# Patient Record
Sex: MALE | Race: WHITE
[De-identification: names, ages, dates, MRNs, and addresses within clinical notes are randomized per-mention and may not be internally consistent; named-entity substitution may affect disease eponyms.]

---

## 2018-03-22 NOTE — RADIOLOGY REPORT (SQ)
EXAM DESCRIPTION:  KNEE LEFT 4 VIEW



COMPLETED DATE/TIME:  3/22/2018 3:37 pm



REASON FOR STUDY:  left knee pain



COMPARISON:  None.



NUMBER OF VIEWS:  Four views.



TECHNIQUE:  AP, lateral, and both oblique radiographic images acquired of the left knee.



LIMITATIONS:  None.



FINDINGS:  MINERALIZATION: Normal.

BONES: There is a total knee arthroplasty in good position.

JOINT: No effusion.

SOFT TISSUES: No soft tissue swelling.  No radio-opaque foreign body.

OTHER: No other significant finding.



IMPRESSION:  NEGATIVE STUDY OF THE LEFT KNEE. NO RADIOGRAPHIC EVIDENCE OF ACUTE INJURY.



TECHNICAL DOCUMENTATION:  JOB ID:  [id number]

 2011 [employer]- All Rights Reserved



Reading location - IP/workstation name: [email or web address]

## 2018-03-22 NOTE — ER DOCUMENT REPORT
ED Extremity Problem, Lower





- General


Chief Complaint: Knee Pain


Stated Complaint: SWOLLEN KNEE


Time Seen by Provider: 03/22/18 15:31


Mode of Arrival: Wheelchair


Information source: Patient


Notes: 





57-year-old male presents to ED for complaint of left knee pain.  He states 

that the left knee has been swollen for about a week with pain.  He states he 

might have slept on it wrong.  States she has been taken Tylenol at home for 

the pain.  States he had a knee replacement in 2003.  He states normally he can 

put weight on it but he has not been able to put weight on it for the last 3 

days.


TRAVEL OUTSIDE OF THE [place] IN LAST 30 DAYS: No





- HPI


Patient complains to provider of: Pain, Swelling.  No: Injury


Location: Knee


Occurred: Other - 3 days


Onset/Duration: Gradual, Persistent


Quality of pain: Sharp, Throbbing


Severity: Severe


Pain Level: 5


Recent injury: No


Associated symptoms: Painful ambulation


Exacerbated by: Hanging down, Movement


Relieved by: Nothing





- Related Data


Allergies/Adverse Reactions: 


 





No Known Allergies Allergy (Verified 12/30/15 16:48)


 











Past Medical History





- General


Information source: Patient





- Social History


Smoking Status: Former Smoker


Cigarette use (# per day): No


Chew tobacco use (# tins/day): No


Smoking Education Provided: No


Frequency of alcohol use: Heavy


Drug Abuse: None


Occupation: disable


Lives with: Spouse/Significant other


Family History: Arthritis, CVA, Hypertension


Patient has suicidal ideation: No


Patient has homicidal ideation: No





- Past Medical History


Cardiac Medical History: Reports: Hx Atrial Fibrillation, Hx Hypertension


Pulmonary Medical History: Reports: None


EENT Medical History: Reports: None


Neurological Medical History: Reports: None


Endocrine Medical History: Reports: None


Renal/ Medical History: Reports: None


Malignancy Medical History: Reports None


GI Medical History: Reports: None


Musculoskeltal Medical History: Reports Hx Arthritis - gout, Reports Hx Gout, 

Reports Hx Musculoskeletal Deformity, Reports Hx Musculoskeletal Trauma


Skin Medical History: Reports None


Psychiatric Medical History: Reports: Hx Anxiety, Hx Bipolar Disorder, Other - 

Insomnia


Traumatic Medical History: Reports: None


Infectious Medical History: Reports: None


Past Surgical History: Reports: Hx Orthopedic Surgery - left knee replacement 

right rotator cuff, Other - Skin cancer removed, lymph node removed





- Immunizations


Hx Diphtheria, Pertussis, Tetanus Vaccination: Yes





Review of Systems





- Review of Systems


Constitutional: No symptoms reported


EENT: No symptoms reported


Cardiovascular: No symptoms reported


Respiratory: No symptoms reported


Gastrointestinal: No symptoms reported


Genitourinary: No symptoms reported


Male Genitourinary: No symptoms reported


Musculoskeletal: Other - Pain to the back of the knee.  He states he might have 

slept on it wrong.  He does have a knee replacement on this knee.  He states 

that his knee is swollen but actually the other knee is larger than this knee.  

He states she has been taken Tylenol at home.


Skin: No symptoms reported


Hematologic/Lymphatic: No symptoms reported


Neurological/Psychological: No symptoms reported


-: Yes All other systems reviewed and negative





Physical Exam





- Vital signs


Vitals: 


 











Temp Pulse BP Pulse Ox


 


 97.7 F   77   119/91 H  93 


 


 03/22/18 15:12  03/22/18 15:12  03/22/18 15:12  03/22/18 15:12











Interpretation: Normal





- General


General appearance: Appears well, Alert





- HEENT


Head: Normocephalic, Atraumatic


Eyes: Normal


Pupils: PERRL





- Respiratory


Respiratory status: No respiratory distress


Chest status: Nontender


Breath sounds: Normal


Chest palpation: Normal





- Cardiovascular


Rhythm: Regular


Heart sounds: Normal auscultation


Murmur: No





- Abdominal


Inspection: Normal


Distension: No distension


Bowel sounds: Normal


Tenderness: Nontender


Organomegaly: No organomegaly





- Back


Back: Normal, Nontender





- Extremities


General upper extremity: Normal inspection, Nontender, Normal color, Normal ROM

, Normal temperature


General lower extremity: Normal temperature.  No: All's sign


Knee: Tender, Pain with ROM, Patellar tendon intact, Popliteal fossa tender, 

Tender joint line, Other - Lower extremity discoloration chronic.  No: Abrasion

, Deformity, Dislocation, Drawer's test instability, Ecchymosis, Instability, 

Joint effusion, Laceration, Laxity with valgus stress, Laxity with varus stress





- Neurological


Neuro grossly intact: Yes


Cognition: Normal


Orientation: AAOx4


[place] Coma Scale Eye Opening: Spontaneous


[place] Coma Scale Verbal: Oriented


[place] Coma Scale Motor: Obeys Commands


[place] Coma Scale Total: 15


Speech: Normal


Motor strength normal: LUE, RUE, LLE, RLE


Sensory: Normal





- Psychological


Associated symptoms: Normal affect, Normal mood





- Skin


Skin Temperature: Warm


Skin Moisture: Dry


Skin Color: Normal





Course





- Re-evaluation


Re-evalutation: 





03/22/18 21:26


Left knee x-ray and venous Doppler were both negative.  Patient stated he could 

not get up out of the chair to get onto the bed when first examined.  But after 

he was told that his x-rays and ultrasound were negative he was able to get 

himself up and into the chair.  Patient was instructed to follow-up with the 

surgeon who did his knee replacement and [facility] for his continued pain.  





- Vital Signs


Vital signs: 


 











Temp Pulse Resp BP Pulse Ox


 


 97.6 F   75   16   118/75   98 


 


 03/22/18 17:00  03/22/18 17:00  03/22/18 17:00  03/22/18 17:00  03/22/18 17:00














- Diagnostic Test


Radiology reviewed: Image reviewed, Reports reviewed





Discharge





- Discharge


Clinical Impression: 


Left knee pain


Qualifiers:


 Chronicity: unspecified Qualified Code(s): M25.562 - Pain in left knee





Condition: Stable


Disposition: HOME, SELF-CARE


Instructions:  Knee Exercise Program (OM)


Additional Instructions: 


He was seen today for pain to the left knee for the last several days.  There 

is no acute changes to your x-ray Doppler is negative for any blood clots or 

Baker's cyst.  There may be a small effusion according to the Doppler on the 

lateral aspect of the knee.





ICE & ELEVATION:


     Apply ice packs frequently against the painful area.  Many different 

schedules are recommended, such as "20 minutes on, 20 minutes off" or "one hour 

ice, two hours rest."  If you need to work, you may need to go longer between 

ice treatments.  You should plan to have the area ice packed AT LEAST one-

fourth of the time.


     The ice should be applied over the wrap, tape, or splint, or over a layer 

of cloth -- not directly against the skin.  Some ice bags have a built-in cloth 

and can be put directly on the skin.


     Your injured part should be elevated as much as possible over the next 48 

hours.  Try to keep the injury above the level of the heart. Avoid use of the 

injured area.  Elevation and rest will decrease the swelling.





ORAL NARCOTIC MEDICATION:


     You have been given a prescription for pain control.  This medication is a 

narcotic.  It's best taken with food, as nausea can result if taken on an empty 

stomach.


     Don't operate machinery or drive within six hours of taking this 

medication.  Do not combine this medicine with alcohol, or with any medication 

which can cause sedation (such as cold tablets or sleeping pills) unless you 

get permission from the physician.


     Narcotics tend to cause constipation.  If possible, drink plenty of fluids 

and eat a diet high in fiber and fruits.





     Please be aware that prescription narcotics also have the potential for 

abuse.  People become addicted to these medications because of the general 

sense of wellbeing that they induce.  This feeling along with a significant 

reduction in tension, anxiety, and aggression provides a stimulating seductive 

quality to these drugs.  Once your pain is under control, we encourage you to 

discard your unused narcotics.











FOLLOW-UP CARE: Please call your orthopedic specialist today and schedule a 

follow-up appointment for this knee pain.  If you cannot call him today then 

calling tomorrow.


If you have been referred to a physician for follow-up care, call the physician

s office for an appointment as you were instructed or within the next two days.

  If you experience worsening or a significant change in your symptoms, notify 

the physician immediately or return to the Emergency Department at any time for 

re-evaluation.


Prescriptions: 


Hydrocodone/Acetaminophen [Norco 5-325 mg Tablet] 1 tab PO Q6HP PRN #5 tablet


 PRN Reason: 


Forms:  Elevated Blood Pressure

## 2018-03-22 NOTE — RADIOLOGY REPORT (SQ)
EXAM DESCRIPTION:  VENOUS UNILATERAL LOWER



COMPLETED DATE/TIME:  3/22/2018 4:50 pm



REASON FOR STUDY:  left leg pain sedentary



COMPARISON:  Left knee plain films 3/22/2018



TECHNIQUE:  Dynamic and static gray scale and color images acquired of the left leg venous system. Se
lected spectral images acquired with additional compression and augmentation maneuvers. The contralat
eral common femoral vein and saphenofemoral junction were also imaged. Images stored on PACS.



LIMITATIONS:  None.



FINDINGS:  LEFT

COMMON FEMORAL: Normal phasicity, compression and augmentation. No visualized echogenic material on g
ray scale. No defects on color images.

FEMORAL: Normal compression and augmentation. No visualized echogenic material on gray scale. No defe
cts on color images.

POPLITEAL: Normal compression, augmentation. No visualized echogenic material on gray scale. No defec
ts on color images.

CALF VESSELS: Normal compression, augmentation. No visualized echogenic material on gray scale. No de
fects on color images.

GSV and SSV: Normal compression, augmentation. No visualized echogenic material on gray scale. No def
ects on color images.

ANY DEEP VENOUS INSUFFICIENCY: Not evaluated.

ANY EVIDENCE OF POPLITEAL CYST: Baker's cyst versus joint effusion in the popliteal fossa

OTHER: No other significant finding.

RIGHT COMMON FEMORAL VEIN AND SAPHENOFEMORAL JUNCTION:

Normal phasicity, compression and augmentation. No visualized echogenic material on gray scale. No de
fects on color images.



IMPRESSION:  NO EVIDENCE OF DVT OR SVT IN THE LEFT LEG.



TECHNICAL DOCUMENTATION:  JOB ID:  [id number]

 2011 [employer]- All Rights Reserved



Reading location - IP/workstation name: [email or web address]

## 2020-05-03 ENCOUNTER — HOSPITAL ENCOUNTER (INPATIENT)
Dept: HOSPITAL 62 - ER | Age: 60
LOS: 2 days | Discharge: HOME | DRG: 440 | End: 2020-05-05
Attending: INTERNAL MEDICINE | Admitting: INTERNAL MEDICINE
Payer: OTHER GOVERNMENT

## 2020-05-03 DIAGNOSIS — Z79.890: ICD-10-CM

## 2020-05-03 DIAGNOSIS — Z79.01: ICD-10-CM

## 2020-05-03 DIAGNOSIS — Z85.828: ICD-10-CM

## 2020-05-03 DIAGNOSIS — F31.9: ICD-10-CM

## 2020-05-03 DIAGNOSIS — M10.9: ICD-10-CM

## 2020-05-03 DIAGNOSIS — E83.42: ICD-10-CM

## 2020-05-03 DIAGNOSIS — Z79.899: ICD-10-CM

## 2020-05-03 DIAGNOSIS — E66.01: ICD-10-CM

## 2020-05-03 DIAGNOSIS — I10: ICD-10-CM

## 2020-05-03 DIAGNOSIS — I48.0: ICD-10-CM

## 2020-05-03 DIAGNOSIS — R11.2: ICD-10-CM

## 2020-05-03 DIAGNOSIS — F10.10: ICD-10-CM

## 2020-05-03 DIAGNOSIS — F41.9: ICD-10-CM

## 2020-05-03 DIAGNOSIS — Z79.84: ICD-10-CM

## 2020-05-03 DIAGNOSIS — K85.20: Primary | ICD-10-CM

## 2020-05-03 DIAGNOSIS — Z96.652: ICD-10-CM

## 2020-05-03 LAB
ADD MANUAL DIFF: NO
ALBUMIN SERPL-MCNC: 3.9 G/DL (ref 3.5–5)
ALP SERPL-CCNC: 214 U/L (ref 38–126)
ANION GAP SERPL CALC-SCNC: 12 MMOL/L (ref 5–19)
APPEARANCE UR: CLEAR
APTT PPP: YELLOW S
AST SERPL-CCNC: 52 U/L (ref 17–59)
BARBITURATES UR QL SCN: NEGATIVE
BASOPHILS # BLD AUTO: 0 10^3/UL (ref 0–0.2)
BASOPHILS NFR BLD AUTO: 0.2 % (ref 0–2)
BILIRUB DIRECT SERPL-MCNC: 0.6 MG/DL (ref 0–0.4)
BILIRUB SERPL-MCNC: 1.5 MG/DL (ref 0.2–1.3)
BILIRUB UR QL STRIP: NEGATIVE
BUN SERPL-MCNC: 13 MG/DL (ref 7–20)
CALCIUM: 9.9 MG/DL (ref 8.4–10.2)
CHLORIDE SERPL-SCNC: 88 MMOL/L (ref 98–107)
CK MB SERPL-MCNC: 2.23 NG/ML (ref ?–4.55)
CK SERPL-CCNC: 37 U/L (ref 55–170)
CO2 SERPL-SCNC: 32 MMOL/L (ref 22–30)
EOSINOPHIL # BLD AUTO: 0.1 10^3/UL (ref 0–0.6)
EOSINOPHIL NFR BLD AUTO: 0.7 % (ref 0–6)
ERYTHROCYTE [DISTWIDTH] IN BLOOD BY AUTOMATED COUNT: 14 % (ref 11.5–14)
GLUCOSE SERPL-MCNC: 150 MG/DL (ref 75–110)
GLUCOSE UR STRIP-MCNC: NEGATIVE MG/DL
HCT VFR BLD CALC: 42.5 % (ref 37.9–51)
HGB BLD-MCNC: 14.9 G/DL (ref 13.5–17)
INR PPP: 1.29
KETONES UR STRIP-MCNC: 20 MG/DL
LYMPHOCYTES # BLD AUTO: 0.5 10^3/UL (ref 0.5–4.7)
LYMPHOCYTES NFR BLD AUTO: 5.3 % (ref 13–45)
MCH RBC QN AUTO: 35.1 PG (ref 27–33.4)
MCHC RBC AUTO-ENTMCNC: 35.1 G/DL (ref 32–36)
MCV RBC AUTO: 100 FL (ref 80–97)
METHADONE UR QL SCN: NEGATIVE
MONOCYTES # BLD AUTO: 0.9 10^3/UL (ref 0.1–1.4)
MONOCYTES NFR BLD AUTO: 9.9 % (ref 3–13)
NEUTROPHILS # BLD AUTO: 7.7 10^3/UL (ref 1.7–8.2)
NEUTS SEG NFR BLD AUTO: 83.9 % (ref 42–78)
NITRITE UR QL STRIP: NEGATIVE
PCP UR QL SCN: NEGATIVE
PH UR STRIP: 6 [PH] (ref 5–9)
PLATELET # BLD: 183 10^3/UL (ref 150–450)
POTASSIUM SERPL-SCNC: 4.3 MMOL/L (ref 3.6–5)
PROT SERPL-MCNC: 7.8 G/DL (ref 6.3–8.2)
PROT UR STRIP-MCNC: 100 MG/DL
PROTHROMBIN TIME: 16.2 SEC (ref 11.4–15.4)
RBC # BLD AUTO: 4.24 10^6/UL (ref 4.35–5.55)
SP GR UR STRIP: 1.02
TOTAL CELLS COUNTED % (AUTO): 100 %
TROPONIN I SERPL-MCNC: 0.05 NG/ML
URINE AMPHETAMINES SCREEN: NEGATIVE
URINE BENZODIAZEPINES SCREEN: NEGATIVE
URINE COCAINE SCREEN: NEGATIVE
URINE MARIJUANA (THC) SCREEN: (no result)
UROBILINOGEN UR-MCNC: 2 MG/DL (ref ?–2)
WBC # BLD AUTO: 9.2 10^3/UL (ref 4–10.5)

## 2020-05-03 PROCEDURE — 85610 PROTHROMBIN TIME: CPT

## 2020-05-03 PROCEDURE — 93010 ELECTROCARDIOGRAM REPORT: CPT

## 2020-05-03 PROCEDURE — 80053 COMPREHEN METABOLIC PANEL: CPT

## 2020-05-03 PROCEDURE — 96365 THER/PROPH/DIAG IV INF INIT: CPT

## 2020-05-03 PROCEDURE — 83690 ASSAY OF LIPASE: CPT

## 2020-05-03 PROCEDURE — 96368 THER/DIAG CONCURRENT INF: CPT

## 2020-05-03 PROCEDURE — 82140 ASSAY OF AMMONIA: CPT

## 2020-05-03 PROCEDURE — 82962 GLUCOSE BLOOD TEST: CPT

## 2020-05-03 PROCEDURE — 36415 COLL VENOUS BLD VENIPUNCTURE: CPT

## 2020-05-03 PROCEDURE — 71045 X-RAY EXAM CHEST 1 VIEW: CPT

## 2020-05-03 PROCEDURE — 80162 ASSAY OF DIGOXIN TOTAL: CPT

## 2020-05-03 PROCEDURE — 96376 TX/PRO/DX INJ SAME DRUG ADON: CPT

## 2020-05-03 PROCEDURE — 84443 ASSAY THYROID STIM HORMONE: CPT

## 2020-05-03 PROCEDURE — 85025 COMPLETE CBC W/AUTO DIFF WBC: CPT

## 2020-05-03 PROCEDURE — 85730 THROMBOPLASTIN TIME PARTIAL: CPT

## 2020-05-03 PROCEDURE — 96361 HYDRATE IV INFUSION ADD-ON: CPT

## 2020-05-03 PROCEDURE — 82553 CREATINE MB FRACTION: CPT

## 2020-05-03 PROCEDURE — 82150 ASSAY OF AMYLASE: CPT

## 2020-05-03 PROCEDURE — 81001 URINALYSIS AUTO W/SCOPE: CPT

## 2020-05-03 PROCEDURE — 76705 ECHO EXAM OF ABDOMEN: CPT

## 2020-05-03 PROCEDURE — C9113 INJ PANTOPRAZOLE SODIUM, VIA: HCPCS

## 2020-05-03 PROCEDURE — 84484 ASSAY OF TROPONIN QUANT: CPT

## 2020-05-03 PROCEDURE — 93005 ELECTROCARDIOGRAM TRACING: CPT

## 2020-05-03 PROCEDURE — 99291 CRITICAL CARE FIRST HOUR: CPT

## 2020-05-03 PROCEDURE — 86140 C-REACTIVE PROTEIN: CPT

## 2020-05-03 PROCEDURE — 80307 DRUG TEST PRSMV CHEM ANLYZR: CPT

## 2020-05-03 PROCEDURE — 83880 ASSAY OF NATRIURETIC PEPTIDE: CPT

## 2020-05-03 PROCEDURE — 96375 TX/PRO/DX INJ NEW DRUG ADDON: CPT

## 2020-05-03 PROCEDURE — 83735 ASSAY OF MAGNESIUM: CPT

## 2020-05-03 PROCEDURE — 82550 ASSAY OF CK (CPK): CPT

## 2020-05-03 RX ADMIN — INSULIN LISPRO SCH: 100 INJECTION, SOLUTION INTRAVENOUS; SUBCUTANEOUS at 18:10

## 2020-05-03 RX ADMIN — MORPHINE SULFATE PRN MG: 10 INJECTION INTRAMUSCULAR; INTRAVENOUS; SUBCUTANEOUS at 21:18

## 2020-05-03 RX ADMIN — SODIUM CHLORIDE PRN MLS/HR: 9 INJECTION, SOLUTION INTRAVENOUS at 21:19

## 2020-05-03 RX ADMIN — PANTOPRAZOLE SODIUM SCH MG: 40 INJECTION, POWDER, FOR SOLUTION INTRAVENOUS at 13:16

## 2020-05-03 RX ADMIN — METOPROLOL SUCCINATE SCH MG: 50 TABLET, EXTENDED RELEASE ORAL at 21:18

## 2020-05-03 RX ADMIN — Medication PRN MLS/HR: at 09:42

## 2020-05-03 RX ADMIN — MAGNESIUM SULFATE IN DEXTROSE SCH MLS/HR: 10 INJECTION, SOLUTION INTRAVENOUS at 10:09

## 2020-05-03 RX ADMIN — DEXAMETHASONE SODIUM PHOSPHATE PRN MG: 10 INJECTION INTRAMUSCULAR; INTRAVENOUS at 18:28

## 2020-05-03 RX ADMIN — MAGNESIUM SULFATE IN DEXTROSE SCH MLS/HR: 10 INJECTION, SOLUTION INTRAVENOUS at 11:21

## 2020-05-03 RX ADMIN — SODIUM CHLORIDE PRN MLS/HR: 9 INJECTION, SOLUTION INTRAVENOUS at 13:16

## 2020-05-03 RX ADMIN — METFORMIN HYDROCHLORIDE SCH MG: 500 TABLET, FILM COATED ORAL at 16:50

## 2020-05-03 RX ADMIN — APIXABAN SCH MG: 5 TABLET, FILM COATED ORAL at 17:07

## 2020-05-03 RX ADMIN — QUETIAPINE FUMARATE SCH MG: 100 TABLET, FILM COATED ORAL at 21:19

## 2020-05-03 RX ADMIN — PANTOPRAZOLE SODIUM SCH MG: 40 INJECTION, POWDER, FOR SOLUTION INTRAVENOUS at 21:19

## 2020-05-03 RX ADMIN — Medication PRN MLS/HR: at 18:28

## 2020-05-03 RX ADMIN — METOPROLOL SUCCINATE SCH MG: 50 TABLET, EXTENDED RELEASE ORAL at 13:16

## 2020-05-03 RX ADMIN — MORPHINE SULFATE PRN MG: 10 INJECTION INTRAMUSCULAR; INTRAVENOUS; SUBCUTANEOUS at 16:51

## 2020-05-03 RX ADMIN — DIGOXIN SCH MG: 0.25 TABLET ORAL at 14:06

## 2020-05-03 RX ADMIN — INSULIN LISPRO SCH: 100 INJECTION, SOLUTION INTRAVENOUS; SUBCUTANEOUS at 23:02

## 2020-05-03 RX ADMIN — LORAZEPAM SCH MG: 1 TABLET ORAL at 17:06

## 2020-05-03 NOTE — ER DOCUMENT REPORT
Entered by TERRANCE PATHAK SCRIBE  05/03/20 0817 





Acting as scribe for:MADHAV PARMAR MD





ED GI/





- General


Chief Complaint: Nausea/Vomiting


Stated Complaint: VOMITING


Time Seen by Provider: 05/03/20 08:04


Primary Care Provider: 


[facility] [Primary Care Provider] - Follow up as needed


Mode of Arrival: Wheelchair


Information source: Patient


Notes: 





This alcoholic 59 year old male patient presents to the emergency department 

today with complaints of abdominal pain for the last x4 days. Patient describes 

the location as "all of it", stating it feels like "like needles or ice picks" 

across his entire abdomen. Patient states he thinks he has food poisoning. He 

denies any diarrhea. 





TRAVEL OUTSIDE OF THE [place] IN LAST 30 DAYS: No





- Related Data


Allergies/Adverse Reactions: 


                                        





No Known Allergies Allergy (Verified 04/23/19 08:36)


   








Home Medications: P TUNABLE TO VERIFY





Past Medical History





- General


Information source: Patient





- Social History


Smoking Status: Never Smoker


Cigarette use (# per day): No


Frequency of alcohol use: Heavy


Drug Abuse: None


Lives with: Family


Family History: Reviewed & Not Pertinent, Arthritis, CVA, Hypertension


Patient has homicidal ideation: No





- Past Medical History


Cardiac Medical History: Reports: Hx Atrial Fibrillation, Hx Hypertension


Musculoskeletal Medical History: Reports Hx Arthritis - gout, Reports Hx Gout, 

Reports Hx Musculoskeletal Deformity, Reports Hx Musculoskeletal Trauma


Psychiatric Medical History: Reports: Hx Anxiety, Hx Bipolar Disorder


Past Surgical History: Reports: Hx Orthopedic Surgery - left knee replacement 

right rotator cuff, Other - Skin cancer removed, lymph node removed





- Immunizations


Hx Diphtheria, Pertussis, Tetanus Vaccination: Yes - NOT UP TO DATE





Review of Systems





- Review of Systems


Constitutional: No symptoms reported


EENT: No symptoms reported


Cardiovascular: No symptoms reported


Respiratory: No symptoms reported


Gastrointestinal: See HPI, Abdominal pain.  denies: Diarrhea


Genitourinary: No symptoms reported


Male Genitourinary: No symptoms reported


Musculoskeletal: No symptoms reported


Skin: No symptoms reported


Hematologic/Lymphatic: No symptoms reported


Neurological/Psychological: No symptoms reported


-: Yes All other systems reviewed and negative





Physical Exam





- Vital signs


Vitals: 


                                        











Temp


 


 97.5 F 


 


 05/03/20 07:00














- General


General appearance: Alert


In distress: None





- HEENT


Head: Normocephalic, Atraumatic


Eyes: Normal


Conjunctiva: Normal





- Respiratory


Respiratory status: No respiratory distress


Chest status: Nontender


Breath sounds: Normal





- Cardiovascular


Rhythm: Irregularly irregular, Tachycardia


Heart sounds: Normal auscultation


Murmur: No





- Abdominal


Inspection: Obese - morbidly


Distension: No distension


Bowel sounds: Normal


Tenderness: Other - very mild diffuse tenderness with palpation.  Seems to be 

most tender in the epigastric region.





- Extremities


General upper extremity: Normal inspection, Nontender, Normal ROM


General lower extremity: Nontender, Edema, Normal ROM





- Neurological


Neuro grossly intact: Yes


Cognition: Normal


Orientation: AAOx4





- Psychological


Associated symptoms: Normal affect, Normal mood





- Skin


Skin Temperature: Warm


Skin Moisture: Dry


Skin Color: Normal





Course





- Vital Signs


Vital signs: 


                                        











Temp Pulse Resp BP Pulse Ox


 


 97.5 F      18   198/110 H  94 


 


 05/03/20 07:00     05/03/20 09:04  05/03/20 09:12  05/03/20 09:04














- Laboratory


Result Diagrams: 


                                 05/03/20 08:36





                                 05/03/20 08:36


Laboratory results interpreted by me: 


                                        











  05/03/20 05/03/20 05/03/20





  05:22 08:36 08:36


 


RBC   4.24 L 


 


MCV   100 H 


 


MCH   35.1 H 


 


Lymph % (Auto)   5.3 L 


 


Seg Neutrophils %   83.9 H 


 


Sodium    131.9 L


 


Chloride    88 L


 


Carbon Dioxide    32 H


 


Glucose    150 H


 


POC Glucose   


 


Magnesium   


 


Total Bilirubin    1.5 H


 


Direct Bilirubin    0.6 H


 


Alkaline Phosphatase    214 H


 


Creatine Kinase   


 


C-Reactive Protein  224.8 H  


 


Lipase    1579.6 H


 


Urine Protein   


 


Urine Ketones   


 


Urine Urobilinogen   


 


Ur Leukocyte Esterase   


 


Urine Ascorbic Acid   














  05/03/20 05/03/20 05/03/20





  08:36 08:55 10:27


 


RBC   


 


MCV   


 


MCH   


 


Lymph % (Auto)   


 


Seg Neutrophils %   


 


Sodium   


 


Chloride   


 


Carbon Dioxide   


 


Glucose   


 


POC Glucose    143 H


 


Magnesium  1.1 L*  


 


Total Bilirubin   


 


Direct Bilirubin   


 


Alkaline Phosphatase   


 


Creatine Kinase  37 L  


 


C-Reactive Protein   


 


Lipase   


 


Urine Protein   100 H 


 


Urine Ketones   20 H 


 


Urine Urobilinogen   2.0 H 


 


Ur Leukocyte Esterase   TRACE H 


 


Urine Ascorbic Acid   40 H 














- Diagnostic Test


Radiology reviewed: Image reviewed, Reports reviewed - Gallbladder ultrasound 

shows fatty enlarged liver, otherwise no acute abnormality.





- EKG Interpretation by Me


EKG shows normal: Axis, Intervals, QRS Complexes.  abnormal: Sinus rhythm, ST-T 

Waves - Nonspecific repolarization abnormality in the lateral leads


Rate: Tachycardia - 146


Rhythm: A.Fib


When compared to previous EKG there are: No significant change





- Consults


  ** MALCOLM Clinton


Time consulted: 10:43


Consulted provider: will come to ER





Critical Care Note





- Critical Care Note


Total time excluding time spent on procedures (mins): 40





Discharge





- Discharge


Clinical Impression: 


 Chronic atrial fibrillation with RVR, Hypomagnesemia





Nausea and vomiting


Qualifiers:


 Vomiting type: unspecified Vomiting Intractability: non-intractable Qualified 

Code(s): R11.2 - Nausea with vomiting, unspecified





Abdominal pain


Qualifiers:


 Abdominal location: epigastric Qualified Code(s): R10.13 - Epigastric pain





Pancreatitis, acute


Qualifiers:


 Pancreatitis type: alcohol induced Acute pancreatitis complication: unspecified

Qualified Code(s): K85.20 - Alcohol induced acute pancreatitis without necrosis 

or infection





High blood pressure


Qualifiers:


 Hypertension type: essential hypertension Qualified Code(s): I10 - Essential 

(primary) hypertension





Condition: Stable


Disposition: ADMITTED AS INPATIENT


Admitting Provider: [place] (Hospitalist)


Unit Admitted: IMCU


Referrals: 


[facility] [Primary Care Provider] - Follow up as needed





I personally performed the services described in the documentation, reviewed and

edited the documentation which was dictated to the scribe in my presence, and it

accurately records my words and actions.

## 2020-05-03 NOTE — EKG REPORT
SEVERITY:- ABNORMAL ECG -

ATRIAL FIBRILLATION, V-RATE 103-190

NONSPECIFIC REPOL ABNORMALITY, LATERAL LEADS

:

Confirmed by: Lisa Andersen MD 03-May-2020 10:15:52

## 2020-05-03 NOTE — RADIOLOGY REPORT (SQ)
EXAM DESCRIPTION:  CHEST SINGLE VIEW



IMAGES COMPLETED DATE/TIME:  5/3/2020 10:26 am



REASON FOR STUDY:  A. fib with RVR, pancreatitis, nausea vomiting



COMPARISON:  2019.



NUMBER OF VIEWS:  One view.



TECHNIQUE:  Single frontal radiographic view of the chest acquired.



LIMITATIONS:  None.



FINDINGS:  LUNGS AND PLEURA: No opacities, masses or pneumothorax. No pleural effusion.

MEDIASTINUM AND HILAR STRUCTURES: No masses.  Contour normal.

HEART AND VASCULAR STRUCTURES: Heart normal in size.  Normal vasculature.

BONES: No acute findings.

HARDWARE: None in the chest.

OTHER: No other significant finding.



IMPRESSION:  NO SIGNIFICANT RADIOGRAPHIC FINDING IN THE CHEST.



TECHNICAL DOCUMENTATION:  JOB ID:  [id number]

 2011 [employer]- All Rights Reserved



Reading location - IP/workstation name: [email or web address]

## 2020-05-03 NOTE — RADIOLOGY REPORT (SQ)
EXAM DESCRIPTION:  U/S ABDOMEN LIMITED W/O DOP



IMAGES COMPLETED DATE/TIME:  5/3/2020 10:00 am



REASON FOR STUDY:  Pancreatitis



COMPARISON:  None.



TECHNIQUE:  Dynamic and static grayscale images acquired of the abdomen and recorded on PACS. Additio
nal selected color Doppler and spectral images recorded.



LIMITATIONS:  Acoustical interference from habitus and overlying bowel gas.



FINDINGS:  PANCREAS: Only partially visualized.  Body and tail not seen.  Head looks normal.

LIVER: Liver looks diffusely echogenic and mildly enlarged at 20 cm.  Likely fatty parenchyma.  No gr
oss mass.

LIVER VASCULATURE: Normal directional flow of the main portal vein and hepatic veins.

GALLBLADDER: No stones. Normal wall thickness. No pericholecystic fluid.

ULTRASOUND-DETECTED QUEEN'S SIGN: Negative.

INTRAHEPATIC DUCTS AND COMMON DUCT: CBD and intrahepatic ducts normal caliber. No filling defects.

INFERIOR VENA CAVA: Normal flow.

AORTA: No aneurysm.

RIGHT KIDNEY:  Normal size. Normal echogenicity. No solid or suspicious masses. No hydronephrosis. No
 calcifications.

PERITONEAL AND RIGHT PLEURAL SPACE: No ascites or effusions.

OTHER: No other significant findings.



IMPRESSION:  Limited study.  Fatty enlarged liver.



TECHNICAL DOCUMENTATION:  JOB ID:  [id number]

 2011 [employer] All Rights Reserved



Reading location - IP/workstation name: [email or web address]

## 2020-05-04 LAB
ADD MANUAL DIFF: NO
ALBUMIN SERPL-MCNC: 3.7 G/DL (ref 3.5–5)
ALP SERPL-CCNC: 161 U/L (ref 38–126)
AMYLASE SERPL-CCNC: 57 U/L (ref 30–110)
ANION GAP SERPL CALC-SCNC: 12 MMOL/L (ref 5–19)
AST SERPL-CCNC: 36 U/L (ref 17–59)
BASOPHILS # BLD AUTO: 0 10^3/UL (ref 0–0.2)
BASOPHILS NFR BLD AUTO: 0.3 % (ref 0–2)
BILIRUB DIRECT SERPL-MCNC: 0.8 MG/DL (ref 0–0.4)
BILIRUB SERPL-MCNC: 1.6 MG/DL (ref 0.2–1.3)
BUN SERPL-MCNC: 12 MG/DL (ref 7–20)
CALCIUM: 8.9 MG/DL (ref 8.4–10.2)
CHLORIDE SERPL-SCNC: 93 MMOL/L (ref 98–107)
CO2 SERPL-SCNC: 25 MMOL/L (ref 22–30)
EOSINOPHIL # BLD AUTO: 0.1 10^3/UL (ref 0–0.6)
EOSINOPHIL NFR BLD AUTO: 0.6 % (ref 0–6)
ERYTHROCYTE [DISTWIDTH] IN BLOOD BY AUTOMATED COUNT: 14 % (ref 11.5–14)
GLUCOSE SERPL-MCNC: 121 MG/DL (ref 75–110)
HCT VFR BLD CALC: 41.3 % (ref 37.9–51)
HGB BLD-MCNC: 14.5 G/DL (ref 13.5–17)
LYMPHOCYTES # BLD AUTO: 0.5 10^3/UL (ref 0.5–4.7)
LYMPHOCYTES NFR BLD AUTO: 5.8 % (ref 13–45)
MCH RBC QN AUTO: 34.8 PG (ref 27–33.4)
MCHC RBC AUTO-ENTMCNC: 35.2 G/DL (ref 32–36)
MCV RBC AUTO: 99 FL (ref 80–97)
MONOCYTES # BLD AUTO: 0.9 10^3/UL (ref 0.1–1.4)
MONOCYTES NFR BLD AUTO: 10.2 % (ref 3–13)
NEUTROPHILS # BLD AUTO: 7.3 10^3/UL (ref 1.7–8.2)
NEUTS SEG NFR BLD AUTO: 83.1 % (ref 42–78)
PLATELET # BLD: 162 10^3/UL (ref 150–450)
POTASSIUM SERPL-SCNC: 4.8 MMOL/L (ref 3.6–5)
PROT SERPL-MCNC: 7.4 G/DL (ref 6.3–8.2)
RBC # BLD AUTO: 4.18 10^6/UL (ref 4.35–5.55)
TOTAL CELLS COUNTED % (AUTO): 100 %
WBC # BLD AUTO: 8.7 10^3/UL (ref 4–10.5)

## 2020-05-04 RX ADMIN — Medication PRN MLS/HR: at 05:47

## 2020-05-04 RX ADMIN — LORAZEPAM SCH MG: 1 TABLET ORAL at 05:39

## 2020-05-04 RX ADMIN — METFORMIN HYDROCHLORIDE SCH MG: 500 TABLET, FILM COATED ORAL at 09:11

## 2020-05-04 RX ADMIN — INSULIN LISPRO SCH: 100 INJECTION, SOLUTION INTRAVENOUS; SUBCUTANEOUS at 08:31

## 2020-05-04 RX ADMIN — METFORMIN HYDROCHLORIDE SCH MG: 500 TABLET, FILM COATED ORAL at 17:57

## 2020-05-04 RX ADMIN — PANTOPRAZOLE SODIUM SCH MG: 40 INJECTION, POWDER, FOR SOLUTION INTRAVENOUS at 09:10

## 2020-05-04 RX ADMIN — APIXABAN SCH MG: 5 TABLET, FILM COATED ORAL at 17:57

## 2020-05-04 RX ADMIN — METOPROLOL SUCCINATE SCH MG: 50 TABLET, EXTENDED RELEASE ORAL at 09:11

## 2020-05-04 RX ADMIN — LORAZEPAM SCH MG: 1 TABLET ORAL at 01:38

## 2020-05-04 RX ADMIN — PANTOPRAZOLE SODIUM SCH MG: 40 INJECTION, POWDER, FOR SOLUTION INTRAVENOUS at 21:04

## 2020-05-04 RX ADMIN — Medication PRN MLS/HR: at 05:44

## 2020-05-04 RX ADMIN — SODIUM CHLORIDE PRN MLS/HR: 9 INJECTION, SOLUTION INTRAVENOUS at 09:12

## 2020-05-04 RX ADMIN — SODIUM CHLORIDE PRN MLS/HR: 9 INJECTION, SOLUTION INTRAVENOUS at 05:45

## 2020-05-04 RX ADMIN — INSULIN LISPRO SCH: 100 INJECTION, SOLUTION INTRAVENOUS; SUBCUTANEOUS at 17:54

## 2020-05-04 RX ADMIN — LORAZEPAM SCH MG: 1 TABLET ORAL at 13:17

## 2020-05-04 RX ADMIN — INSULIN LISPRO SCH: 100 INJECTION, SOLUTION INTRAVENOUS; SUBCUTANEOUS at 22:30

## 2020-05-04 RX ADMIN — MORPHINE SULFATE PRN MG: 10 INJECTION INTRAMUSCULAR; INTRAVENOUS; SUBCUTANEOUS at 04:08

## 2020-05-04 RX ADMIN — QUETIAPINE FUMARATE SCH MG: 100 TABLET, FILM COATED ORAL at 21:06

## 2020-05-04 RX ADMIN — LORAZEPAM SCH: 1 TABLET ORAL at 01:38

## 2020-05-04 RX ADMIN — LORAZEPAM SCH MG: 1 TABLET ORAL at 17:57

## 2020-05-04 RX ADMIN — DIGOXIN SCH MG: 0.25 TABLET ORAL at 09:10

## 2020-05-04 RX ADMIN — INSULIN LISPRO SCH: 100 INJECTION, SOLUTION INTRAVENOUS; SUBCUTANEOUS at 12:56

## 2020-05-04 RX ADMIN — METOPROLOL SUCCINATE SCH MG: 50 TABLET, EXTENDED RELEASE ORAL at 21:04

## 2020-05-04 RX ADMIN — MORPHINE SULFATE PRN MG: 10 INJECTION INTRAMUSCULAR; INTRAVENOUS; SUBCUTANEOUS at 21:03

## 2020-05-04 RX ADMIN — DEXAMETHASONE SODIUM PHOSPHATE PRN MG: 10 INJECTION INTRAMUSCULAR; INTRAVENOUS at 21:04

## 2020-05-04 RX ADMIN — APIXABAN SCH MG: 5 TABLET, FILM COATED ORAL at 09:10

## 2020-05-04 NOTE — PDOC PROGRESS REPORT
Subjective


Progress Note for:: 05/04/20


Reason For Visit: 


ABDOMINAL PAIN,ALCOHOL ABUSE,PANCREATITIS,


5/4/2020


She was admitted for alcoholic induced pancreatitis, reportedly has first case, 

with persistent vomiting





Physical Exam


Vital Signs: 


                                        











Temp Pulse Resp BP Pulse Ox


 


 97.4 F   87   18   132/80 H  99 


 


 05/04/20 07:53  05/04/20 09:00  05/04/20 07:53  05/04/20 09:00  05/04/20 07:53








                                 Intake & Output











 05/03/20 05/04/20 05/05/20





 06:59 06:59 06:59


 


Intake Total  3438 23


 


Balance  3438 23


 


Weight  165.108 kg 











General appearance: PRESENT: no acute distress


Respiratory exam: PRESENT: clear to auscultation madisyn.  ABSENT: rales, rhonchi, 

wheezes


Cardiovascular exam: PRESENT: RRR.  ABSENT: diastolic murmur, rubs, systolic 

murmur


GI/Abdominal exam: PRESENT: distended


Neurological exam: PRESENT: alert, awake, oriented to person, oriented to place,

oriented to time, oriented to situation, CN II-XII grossly intact.  ABSENT: 

motor sensory deficit


Psychiatric exam: PRESENT: appropriate affect, normal mood.  ABSENT: homicidal 

ideation, suicidal ideation





Results


Laboratory Results: 


                                        





                                 05/04/20 05:25 





                                 05/04/20 05:25 





                                        











  05/03/20 05/03/20 05/04/20





  08:36 12:40 05:25


 


WBC    8.7


 


RBC    4.18 L


 


Hgb    14.5


 


Hct    41.3


 


MCV    99 H


 


MCH    34.8 H


 


MCHC    35.2


 


RDW    14.0


 


Plt Count    162


 


Seg Neutrophils %    83.1 H


 


Sodium   


 


Potassium   


 


Chloride   


 


Carbon Dioxide   


 


Anion Gap   


 


BUN   


 


Creatinine   


 


Est GFR (African Amer)   


 


Glucose   


 


Calcium   


 


Magnesium   


 


Total Bilirubin   


 


AST   


 


Alkaline Phosphatase   


 


Ammonia   < 8.7 L 


 


Total Protein   


 


Albumin   


 


Amylase   


 


Lipase   


 


TSH  1.43  














  05/04/20





  05:25


 


WBC 


 


RBC 


 


Hgb 


 


Hct 


 


MCV 


 


MCH 


 


MCHC 


 


RDW 


 


Plt Count 


 


Seg Neutrophils % 


 


Sodium  130.3 L


 


Potassium  4.8


 


Chloride  93 L


 


Carbon Dioxide  25


 


Anion Gap  12


 


BUN  12


 


Creatinine  0.69


 


Est GFR (African Amer)  > 60


 


Glucose  121 H


 


Calcium  8.9


 


Magnesium  1.6


 


Total Bilirubin  1.6 H


 


AST  36


 


Alkaline Phosphatase  161 H


 


Ammonia 


 


Total Protein  7.4


 


Albumin  3.7


 


Amylase  57


 


Lipase  895.4 H


 


TSH 








                                        











  05/03/20 05/03/20 05/03/20





  08:36 08:36 08:36


 


Creatine Kinase  37 L  


 


CK-MB (CK-2)   


 


Troponin I   0.045 


 


NT-Pro-B Natriuret Pep    3830 H














  05/03/20





  12:40


 


Creatine Kinase 


 


CK-MB (CK-2)  2.23


 


Troponin I  0.049


 


NT-Pro-B Natriuret Pep 











Impressions: 


                                        





Abdomen Ultrasound  05/03/20 09:16


IMPRESSION:  Limited study.  Fatty enlarged liver.


 








Chest X-Ray  05/03/20 10:11


IMPRESSION:  NO SIGNIFICANT RADIOGRAPHIC FINDING IN THE CHEST.


 














Assessment and Plan





- Diagnosis


(1) Alcohol abuse


Is this a current diagnosis for this admission?: Yes   





(2) Abdominal pain


Qualifiers: 


   Abdominal location: epigastric   Qualified Code(s): R10.13 - Epigastric pain 

 


Is this a current diagnosis for this admission?: Yes   





(3) Chronic atrial fibrillation with RVR


Is this a current diagnosis for this admission?: Yes   





(4) High blood pressure


Qualifiers: 


   Hypertension type: essential hypertension   Qualified Code(s): I10 - 

Essential (primary) hypertension   


Is this a current diagnosis for this admission?: Yes   





(5) Hypomagnesemia


Is this a current diagnosis for this admission?: Yes   





(6) Nausea and vomiting


Qualifiers: 


   Vomiting type: unspecified   Vomiting Intractability: non-intractable   

Qualified Code(s): R11.2 - Nausea with vomiting, unspecified   


Is this a current diagnosis for this admission?: Yes   





(7) Pancreatitis, acute


Qualifiers: 


   Pancreatitis type: alcohol induced   Acute pancreatitis complication: 

unspecified   Qualified Code(s): K85.20 - Alcohol induced acute pancreatitis 

without necrosis or infection   


Is this a current diagnosis for this admission?: Yes   





- Plan Summary


Summary: 


Patient will be admitted to the hospital kept n.p.o., IV fluids, IV pain 

medications,.  Follow with serial labs





5/4/2020


Patient's main complaint is of being hungry.  He has had no vomiting


Temperature 98.2 pulse 80 blood pressure 122/70 O2 sat between 90 and 97% on 2 L


White count is gone down slightly today 8.7 , sodium is 130





Alkaline phosphatase is down from 214-161 lipase is down from 1579-895


Rate of his atrial fib is better controlled today at 66 when he was admitted it 

was around 103





I am starting clear liquids and advancing his diet as tolerated.  I explained to

patient that this may be too early and he may start to become nauseated and 

vomit or have abdominal pain.  If this occurs then naturally we will have to 

make him n.p.o. again resume his IV fluids.


Have stopped his Cardizem drip as he is on a p.o. beta-blocker and his rates 

appear to be well controlled


Patient does well may discharge to home tomorrow on his current medications


His atrial fib is paroxysmal and chronic in nature his pancreatitis is the first

episode


Patient appears medically stable.  If he stays longer than Tuesday he may need 

to be treated prophylactically for DTs  


I discussed this with patient's nurse





- Time


Time Spent with patient: 25-34 minutes

## 2020-05-04 NOTE — PDOC H&P
History of Present Illness


Admission Date/PCP: 


  05/03/20 10:42





  [facility]





History of Present Illness: 


GREGORY GARCIA is a 59 year old male admitted through the emergency room on 

5/3/2024 acute pancreatitis secondary to alcohol abuse.  Patient tells me he 

drinks about a pint of vodka on a regular basis, I am unsure if it is every day 

or every other day.  Patient tells me has been drinking alcohol for many many 

years , he tells me he has been vomiting 3-4 times every day for the last 4 d

ays.





Patient is also been having some vague abdominal pain for the last for 5 days.  

Patient states he is never had pancreatitis before.  Patient denies fever chills

or chest pain.  The emergency room and on the floor patient has refused CT scan 

of his abdomen and pelvis.  Informed patient we will admit him to the hospital 

for IV pain medication IV fluids and n.p.o.. Patient was quite upset that he 

will not be able to eat any food.  


Admission lipase 1579





Past Medical History


Cardiac Medical History: Reports: Atrial Fibrillation, Hypertension


   Denies: Coronary Artery Disease, Myocardial Infarction


Pulmonary Medical History: 


   Denies: Asthma, Bronchitis, Chronic Obstructive Pulmonary Disease (COPD), 

Pneumonia


Neurological Medical History: 


   Denies: Seizures


Musculoskeltal Medical History: Reports: Arthritis - gout, Gout


Psychiatric Medical History: Reports: Bipolar Disorder, Depression


Hematology: 


   Denies: Anemia





Past Surgical History


Past Surgical History: Reports: Orthopedic Surgery - left knee replacement right

rotator cuff, Other - Skin cancer removed, lymph node removed





Social History


Lives with: Family


Smoking Status: Never Smoker


Electronic Cigarette use?: No


Frequency of Alcohol Use: Heavy


Hx Recreational Drug Use: No


Drugs: None


Hx Prescription Drug Abuse: No





- Advance Directive


Resuscitation Status: Full Code





Family History


Family History: Reviewed & Not Pertinent, Arthritis, CVA, Hypertension


Parental Family History Reviewed: No


Children Family History Reviewed: No


Sibling(s) Family History Reviewed.: No





Medication/Allergy


Home Medications: 








Apixaban [Eliquis 5 mg Tablet] 5 mg PO BID 04/23/19 


Digoxin [Lanoxin 0.25 mg Tablet] 0.25 mg PO DAILY 04/23/19 


Lisinopril [Prinivil] 10 mg PO DAILY 04/23/19 


Metoprolol Succinate 200 mg PO QAM 04/23/19 


Colchicine [Mitigare] 0.6 mg PO ASDIR PRN MDD 3 CAPS 05/03/20 


Hydrocodone/Acetaminophen [Norco 5-325 mg Tablet] 1 tab PO Q6HP PRN MDD FILLED 

4/28 FOR 3 DAY SUPPLY 05/03/20 


Levothyroxine Sodium [Synthroid 50 Mcg Tablet] 50 mcg PO DAILY 05/03/20 


Metformin HCl [Glucophage 500 mg Tablet] 500 mg PO BIDACBS 05/03/20 


Sulfamethoxazole/Trimethoprim [Septra-Ds 800-160 mg Tablet] 1 tab PO Q12 MDD 

FILLED 4/28 FOR 7 DAYS 05/03/20 








Allergies/Adverse Reactions: 


                                        





No Known Allergies Allergy (Verified 04/23/19 08:36)


   











Review of Systems


Constitutional: ABSENT: chills, fever(s), headache(s), weight gain, weight loss


Cardiovascular: ABSENT: chest pain, dyspnea on exertion, edema, orthropnea, 

palpitations


Respiratory: ABSENT: cough, hemoptysis


Gastrointestinal: PRESENT: abdominal pain, nausea, vomiting


Neurological: ABSENT: abnormal gait, abnormal speech, confusion, dizziness, 

focal weakness, syncope


Psychiatric: ABSENT: anxiety, depression, homidical ideation, suicidal ideation





Physical Exam


Vital Signs: 


                                        











Temp Pulse Resp BP Pulse Ox


 


 97.4 F   78   18   130/80 H  99 


 


 05/04/20 07:53  05/04/20 08:00  05/04/20 07:53  05/04/20 08:00  05/04/20 07:53








                                 Intake & Output











 05/03/20 05/04/20 05/05/20





 06:59 06:59 06:59


 


Intake Total  3438 


 


Balance  3438 


 


Weight  165.108 kg 











General appearance: PRESENT: mild distress


Respiratory exam: PRESENT: clear to auscultation madisyn.  ABSENT: rales, rhonchi, 

wheezes


Cardiovascular exam: PRESENT: RRR.  ABSENT: diastolic murmur, rubs, systolic 

murmur


GI/Abdominal exam: PRESENT: distended, soft, tenderness - Generalized tenderness


Neurological exam: PRESENT: alert, awake, oriented to person, oriented to place,

oriented to time, oriented to situation, CN II-XII grossly intact.  ABSENT: 

motor sensory deficit


Psychiatric exam: PRESENT: appropriate affect, normal mood.  ABSENT: homicidal 

ideation, suicidal ideation





Results


Laboratory Results: 


                                        





                                 05/04/20 05:25 





                                 05/04/20 05:25 





                                        











  05/03/20 05/03/20 05/03/20





  05:22 08:36 08:36


 


WBC   9.2 


 


RBC   4.24 L 


 


Hgb   14.9 


 


Hct   42.5 


 


MCV   100 H 


 


MCH   35.1 H 


 


MCHC   35.1 


 


RDW   14.0 


 


Plt Count   183 


 


Seg Neutrophils %   83.9 H 


 


Sodium    131.9 L


 


Potassium    4.3


 


Chloride    88 L


 


Carbon Dioxide    32 H


 


Anion Gap    12


 


BUN    13


 


Creatinine    0.73


 


Est GFR ( Amer)    > 60


 


Glucose    150 H


 


Calcium    9.9


 


Magnesium   


 


Total Bilirubin    1.5 H


 


AST    52


 


Alkaline Phosphatase    214 H


 


Ammonia   


 


C-Reactive Protein  224.8 H  


 


Total Protein    7.8


 


Albumin    3.9


 


Amylase   


 


Lipase    1579.6 H


 


TSH   


 


Urine Color   


 


Urine Appearance   


 


Urine pH   


 


Ur Specific Gravity   


 


Urine Protein   


 


Urine Glucose (UA)   


 


Urine Ketones   


 


Urine Blood   


 


Urine Nitrite   


 


Ur Leukocyte Esterase   


 


Urine WBC (Auto)   


 


Urine RBC (Auto)   














  05/03/20 05/03/20 05/03/20





  08:36 08:36 08:55


 


WBC   


 


RBC   


 


Hgb   


 


Hct   


 


MCV   


 


MCH   


 


MCHC   


 


RDW   


 


Plt Count   


 


Seg Neutrophils %   


 


Sodium   


 


Potassium   


 


Chloride   


 


Carbon Dioxide   


 


Anion Gap   


 


BUN   


 


Creatinine   


 


Est GFR (African Amer)   


 


Glucose   


 


Calcium   


 


Magnesium  1.1 L*  


 


Total Bilirubin   


 


AST   


 


Alkaline Phosphatase   


 


Ammonia   


 


C-Reactive Protein   


 


Total Protein   


 


Albumin   


 


Amylase   


 


Lipase   


 


TSH   1.43 


 


Urine Color    YELLOW


 


Urine Appearance    CLEAR


 


Urine pH    6.0


 


Ur Specific Gravity    1.020


 


Urine Protein    100 H


 


Urine Glucose (UA)    NEGATIVE


 


Urine Ketones    20 H


 


Urine Blood    NEGATIVE


 


Urine Nitrite    NEGATIVE


 


Ur Leukocyte Esterase    TRACE H


 


Urine WBC (Auto)    7


 


Urine RBC (Auto)    4














  05/03/20 05/04/20 05/04/20





  12:40 05:25 05:25


 


WBC   8.7 


 


RBC   4.18 L 


 


Hgb   14.5 


 


Hct   41.3 


 


MCV   99 H 


 


MCH   34.8 H 


 


MCHC   35.2 


 


RDW   14.0 


 


Plt Count   162 


 


Seg Neutrophils %   83.1 H 


 


Sodium    130.3 L


 


Potassium    4.8


 


Chloride    93 L


 


Carbon Dioxide    25


 


Anion Gap    12


 


BUN    12


 


Creatinine    0.69


 


Est GFR ( Amer)    > 60


 


Glucose    121 H


 


Calcium    8.9


 


Magnesium    1.6


 


Total Bilirubin    1.6 H


 


AST    36


 


Alkaline Phosphatase    161 H


 


Ammonia  < 8.7 L  


 


C-Reactive Protein   


 


Total Protein    7.4


 


Albumin    3.7


 


Amylase    57


 


Lipase    895.4 H


 


TSH   


 


Urine Color   


 


Urine Appearance   


 


Urine pH   


 


Ur Specific Gravity   


 


Urine Protein   


 


Urine Glucose (UA)   


 


Urine Ketones   


 


Urine Blood   


 


Urine Nitrite   


 


Ur Leukocyte Esterase   


 


Urine WBC (Auto)   


 


Urine RBC (Auto)   








                                        











  05/03/20 05/03/20 05/03/20





  08:36 08:36 08:36


 


Creatine Kinase  37 L  


 


CK-MB (CK-2)   


 


Troponin I   0.045 


 


NT-Pro-B Natriuret Pep    3830 H














  05/03/20





  12:40


 


Creatine Kinase 


 


CK-MB (CK-2)  2.23


 


Troponin I  0.049


 


NT-Pro-B Natriuret Pep 











Impressions: 


                                        





Abdomen Ultrasound  05/03/20 09:16


IMPRESSION:  Limited study.  Fatty enlarged liver.


 








Chest X-Ray  05/03/20 10:11


IMPRESSION:  NO SIGNIFICANT RADIOGRAPHIC FINDING IN THE CHEST.


 














Assessment and Plan





- Diagnosis


(1) Alcohol abuse


Is this a current diagnosis for this admission?: Yes   





(2) Abdominal pain


Qualifiers: 


   Abdominal location: epigastric   Qualified Code(s): R10.13 - Epigastric pain 

 


Is this a current diagnosis for this admission?: Yes   





(3) Chronic atrial fibrillation with RVR


Is this a current diagnosis for this admission?: Yes   





(4) High blood pressure


Qualifiers: 


   Hypertension type: essential hypertension   Qualified Code(s): I10 - 

Essential (primary) hypertension   


Is this a current diagnosis for this admission?: Yes   





(5) Hypomagnesemia


Is this a current diagnosis for this admission?: Yes   





(6) Nausea and vomiting


Qualifiers: 


   Vomiting type: unspecified   Vomiting Intractability: non-intractable   

Qualified Code(s): R11.2 - Nausea with vomiting, unspecified   


Is this a current diagnosis for this admission?: Yes   





(7) Pancreatitis, acute


Qualifiers: 


   Pancreatitis type: alcohol induced   Acute pancreatitis complication: 

unspecified   Qualified Code(s): K85.20 - Alcohol induced acute pancreatitis 

without necrosis or infection   


Is this a current diagnosis for this admission?: Yes   





- Plan Summary


Summary: 


Patient will be admitted to the hospital kept n.p.o., IV fluids, IV pain 

medications,.  Follow with serial labs





- Time


Time Spent with patient: 35 or more minutes

## 2020-05-04 NOTE — EKG REPORT
SEVERITY:- ABNORMAL ECG -

ATRIAL FIBRILLATION, V-RATE 

NONSPECIFIC T ABNORMALITIES, LATERAL LEADS

:

Confirmed by: Chas Velasquez MD 04-May-2020 07:17:02

## 2020-05-05 VITALS — DIASTOLIC BLOOD PRESSURE: 91 MMHG | SYSTOLIC BLOOD PRESSURE: 163 MMHG

## 2020-05-05 RX ADMIN — LORAZEPAM SCH: 1 TABLET ORAL at 06:56

## 2020-05-05 RX ADMIN — LORAZEPAM SCH: 1 TABLET ORAL at 00:00

## 2020-05-05 RX ADMIN — INSULIN LISPRO SCH: 100 INJECTION, SOLUTION INTRAVENOUS; SUBCUTANEOUS at 08:05

## 2020-05-05 RX ADMIN — METFORMIN HYDROCHLORIDE SCH MG: 500 TABLET, FILM COATED ORAL at 08:08

## 2020-05-05 NOTE — PDOC DISCHARGE SUMMARY
Impression





- Admit/DC Date/PCP


Admission Date/Primary Care Provider: 


  05/03/20 10:42





  [facility]


 59 year old male admitted through the emergency room on 5/3/2024 acute 

pancreatitis secondary to alcohol abuse.  Patient tells me he drinks about a 

pint of vodka on a regular basis, I am unsure if it is every day or every other 

day.  Patient tells me has been drinking alcohol for many many years , he tells 

me he has been vomiting 3-4 times every day for the last 4 days.





Patient is also been having some vague abdominal pain for the last for 5 days.  

Patient states he is never had pancreatitis before.  Patient denies fever chills

or chest pain.  The emergency room and on the floor patient has refused CT scan 

of his abdomen and pelvis.  Informed patient we will admit him to the hospital 

for IV pain medication IV fluids and n.p.o.. Patient was quite upset that he 

will not be able to eat any food.  


Admission lipase 1579


Discharge Date: 05/05/20





- Assessment


Summary: 


Patient will be admitted to the hospital kept n.p.o., IV fluids, IV pain 

medications,.  Follow with serial labs





5/4/2020


Patient's main complaint is of being hungry.  He has had no vomiting


Temperature 98.2 pulse 80 blood pressure 122/70 O2 sat between 90 and 97% on 2 L


White count is gone down slightly today 8.7 , sodium is 130





Alkaline phosphatase is down from 214-161 lipase is down from 1579-895


Rate of his atrial fib is better controlled today at 66 when he was admitted it 

was around 103





I am starting clear liquids and advancing his diet as tolerated.  I explained to

patient that this may be too early and he may start to become nauseated and 

vomit or have abdominal pain.  If this occurs then naturally we will have to 

make him n.p.o. again resume his IV fluids.


Have stopped his Cardizem drip as he is on a p.o. beta-blocker and his rates 

appear to be well controlled


Patient does well may discharge to home tomorrow on his current medications


His atrial fib is paroxysmal and chronic in nature his pancreatitis is the first

episode


Patient appears medically stable.  If he stays longer than Tuesday he may need 

to be treated prophylactically for DTs  


I discussed this with patient's nurse








5/5/20- lipase level is 895 , pt is able to tolerate diet, no c/o abd 

pain,nausea and vomitings, advised to stay overnight but he prefers to go home. 

if the symptoms like n/v and abd pain recurs he was advised to come to ER asap





- Additional Information


Resuscitation Status: Full Code


Discharge Diet: Diabetic


Discharge Activity: Activity As Tolerated


Referrals: 


[facility] [[facility] - Follow up as needed


Home Medications: 








Lisinopril [Prinivil] 10 mg PO DAILY 04/23/19 


Metoprolol Succinate 200 mg PO QAM 04/23/19 


Colchicine [Mitigare] 0.6 mg PO ASDIR PRN MDD 3 CAPS 05/03/20 


Levothyroxine Sodium [Synthroid 0.05 mg Tablet] 50 mcg PO DAILY 05/03/20 


Metformin HCl [Glucophage 500 mg Tablet] 500 mg PO BIDACBS 05/03/20 


Apixaban [Eliquis 5 mg Tablet] 5 mg PO BID  tablet 05/05/20 


Digoxin [Lanoxin 0.25 mg Tablet] 0.25 mg PO DAILY  tablet 05/05/20 


Furosemide [Lasix 40 mg Tablet] 40 mg PO DAILY  tablet 05/05/20 


Lisinopril [Prinivil 5 mg Tablet] 2.5 mg PO DAILY  tablet 05/05/20 


Metoprolol Succinate [Toprol Xl 50 mg Tab.sr] 50 mg PO Q12  tab.sr.24h 05/05/20 


Quetiapine Fumarate [Seroquel 100 mg Tablet] 300 mg PO QHS  tablet 05/05/20 











History of Present Illiness


History of Present Illness: 


GREGORY GARCIA is a 59 year old male








Hospital Course


Hospital Course: 


 59 year old male admitted through the emergency room on 5/3/2024 acute 

pancreatitis secondary to alcohol abuse.  Patient tells me he drinks about a 

pint of vodka on a regular basis, I am unsure if it is every day or every other 

day.  Patient tells me has been drinking alcohol for many many years , he tells 

me he has been vomiting 3-4 times every day for the last 4 days.





Patient is also been having some vague abdominal pain for the last for 5 days.  

Patient states he is never had pancreatitis before.  Patient denies fever chills

or chest pain.  The emergency room and on the floor patient has refused CT scan 

of his abdomen and pelvis.  Informed patient we will admit him to the hospital 

for IV pain medication IV fluids and n.p.o.. Patient was quite upset that he 

will not be able to eat any food.  


Admission lipase 1579





5/4/20-5/4/2020


She was admitted for alcoholic induced pancreatitis, reportedly has first case, 

with persistent vomiting





Physical Exam


Vital Signs: 


                                        











Temp Pulse Resp BP Pulse Ox


 


 98.9 F   95   20   163/91 H  91 L


 


 05/05/20 10:01  05/05/20 10:01  05/05/20 10:01  05/05/20 10:01  05/05/20 10:01








                                 Intake & Output











 05/04/20 05/05/20 05/06/20





 06:59 06:59 06:59


 


Intake Total 3438 703 


 


Output Total  6125 


 


Balance 3438 -1372 


 


Weight 165.108 kg  











General appearance: PRESENT: no acute distress, obese


Head exam: PRESENT: atraumatic


Eye exam: PRESENT: PERRLA


Ear exam: PRESENT: normal external ear exam


Mouth exam: PRESENT: neck supple


Teeth exam: PRESENT: poor dentation


Neck exam: ABSENT: carotid bruit, JVD, lymphadenopathy, thyromegaly


Respiratory exam: PRESENT: decreased breath sounds


Cardiovascular exam: PRESENT: RRR.  ABSENT: diastolic murmur, rubs, systolic 

murmur


GI/Abdominal exam: PRESENT: normal bowel sounds, soft.  ABSENT: distended, 

guarding, mass, organolmegaly, rebound, tenderness


Rectal exam: PRESENT: deferred


Extremities exam: PRESENT: full ROM.  ABSENT: calf tenderness, clubbing, pedal 

edema


Neurological exam: PRESENT: alert, awake, oriented to person, oriented to place,

oriented to time, oriented to situation, CN II-XII grossly intact.  ABSENT: 

motor sensory deficit


Psychiatric exam: PRESENT: appropriate affect, normal mood.  ABSENT: homicidal 

ideation, suicidal ideation





Results


Laboratory Results: 


                                        











WBC  8.7 10^3/uL (4.0-10.5)   05/04/20  05:25    


 


RBC  4.18 10^6/uL (4.35-5.55)  L  05/04/20  05:25    


 


Hgb  14.5 g/dL (13.5-17.0)   05/04/20  05:25    


 


Hct  41.3 % (37.9-51.0)   05/04/20  05:25    


 


MCV  99 fl (80-97)  H  05/04/20  05:25    


 


MCH  34.8 pg (27.0-33.4)  H  05/04/20  05:25    


 


MCHC  35.2 g/dL (32.0-36.0)   05/04/20  05:25    


 


RDW  14.0 % (11.5-14.0)   05/04/20  05:25    


 


Plt Count  162 10^3/uL (150-450)   05/04/20  05:25    


 


Lymph % (Auto)  5.8 % (13-45)  L  05/04/20  05:25    


 


Mono % (Auto)  10.2 % (3-13)   05/04/20  05:25    


 


Eos % (Auto)  0.6 % (0-6)   05/04/20  05:25    


 


Baso % (Auto)  0.3 % (0-2)   05/04/20  05:25    


 


Absolute Neuts (auto)  7.3 10^3/uL (1.7-8.2)   05/04/20  05:25    


 


Absolute Lymphs (auto)  0.5 10^3/uL (0.5-4.7)   05/04/20  05:25    


 


Absolute Monos (auto)  0.9 10^3/uL (0.1-1.4)   05/04/20  05:25    


 


Absolute Eos (auto)  0.1 10^3/uL (0.0-0.6)   05/04/20  05:25    


 


Absolute Basos (auto)  0.0 10^3/uL (0.0-0.2)   05/04/20  05:25    


 


Seg Neutrophils %  83.1 % (42-78)  H  05/04/20  05:25    


 


PT  16.2 SEC (11.4-15.4)  H  05/03/20  08:36    


 


INR  1.29   05/03/20  08:36    


 


APTT  39.1 SEC (23.5-35.8)  H  05/04/20  05:25    


 


Sodium  130.3 mmol/L (137-145)  L  05/04/20  05:25    


 


Potassium  4.8 mmol/L (3.6-5.0)   05/04/20  05:25    


 


Chloride  93 mmol/L ()  L  05/04/20  05:25    


 


Carbon Dioxide  25 mmol/L (22-30)   05/04/20  05:25    


 


Anion Gap  12  (5-19)   05/04/20  05:25    


 


BUN  12 mg/dL (7-20)   05/04/20  05:25    


 


Creatinine  0.69 mg/dL (0.52-1.25)   05/04/20  05:25    


 


Est GFR ( Amer)  > 60  (>60)   05/04/20  05:25    


 


Est GFR (MDRD) Non-Af  > 60  (>60)   05/04/20  05:25    


 


Glucose  121 mg/dL ()  H  05/04/20  05:25    


 


POC Glucose  119 mg/dL ()  H  05/05/20  07:48    


 


Calcium  8.9 mg/dL (8.4-10.2)   05/04/20  05:25    


 


Magnesium  1.6 mg/dL (1.6-2.3)   05/04/20  05:25    


 


Total Bilirubin  1.6 mg/dL (0.2-1.3)  H  05/04/20  05:25    


 


Direct Bilirubin  0.8 mg/dL (0.0-0.4)  H  05/04/20  05:25    


 


Neonat Total Bilirubin  Not Reportable   05/04/20  05:25    


 


Neonat Direct Bilirubin  Not Reportable   05/04/20  05:25    


 


Neonat Indirect Bili  Not Reportable   05/04/20  05:25    


 


AST  36 U/L (17-59)   05/04/20  05:25    


 


ALT  34 U/L (<50)   05/04/20  05:25    


 


Alkaline Phosphatase  161 U/L ()  H  05/04/20  05:25    


 


Ammonia  < 8.7 umol/L (9-33)  L  05/03/20  12:40    


 


Creatine Kinase  37 U/L ()  L  05/03/20  08:36    


 


CK-MB (CK-2)  2.23 ng/mL (<4.55)   05/03/20  12:40    


 


Troponin I  0.049 ng/mL  05/03/20  12:40    


 


C-Reactive Protein  224.8 mg/L (<10.0)  H  05/03/20  05:22    


 


NT-Pro-B Natriuret Pep  3830 pg/mL (<125)  H  05/03/20  08:36    


 


Total Protein  7.4 g/dL (6.3-8.2)   05/04/20  05:25    


 


Albumin  3.7 g/dL (3.5-5.0)   05/04/20  05:25    


 


Amylase  57 U/L ()   05/04/20  05:25    


 


Lipase  895.4 U/L ()  H  05/04/20  05:25    


 


TSH  1.43 uIU/mL (0.47-4.68)   05/03/20  08:36    


 


Urine Color  YELLOW   05/03/20  08:55    


 


Urine Appearance  CLEAR   05/03/20  08:55    


 


Urine pH  6.0  (5.0-9.0)   05/03/20  08:55    


 


Ur Specific Gravity  1.020   05/03/20  08:55    


 


Urine Protein  100 mg/dL (NEGATIVE)  H  05/03/20  08:55    


 


Urine Glucose (UA)  NEGATIVE mg/dL (NEGATIVE)   05/03/20  08:55    


 


Urine Ketones  20 mg/dL (NEGATIVE)  H  05/03/20  08:55    


 


Urine Blood  NEGATIVE  (NEGATIVE)   05/03/20  08:55    


 


Urine Nitrite  NEGATIVE  (NEGATIVE)   05/03/20  08:55    


 


Urine Bilirubin  NEGATIVE  (NEGATIVE)   05/03/20  08:55    


 


Urine Urobilinogen  2.0 mg/dL (<2.0)  H  05/03/20  08:55    


 


Ur Leukocyte Esterase  TRACE  (NEGATIVE)  H  05/03/20  08:55    


 


Urine WBC (Auto)  7 /HPF  05/03/20  08:55    


 


Urine RBC (Auto)  4 /HPF  05/03/20  08:55    


 


U Hyaline Cast (Auto)  1 /LPF  05/03/20  08:55    


 


Squamous Epi Cells Auto  3 /HPF  05/03/20  08:55    


 


Urine Mucus (Auto)  RARE /LPF  05/03/20  08:55    


 


Urine Ascorbic Acid  40  (NEGATIVE)  H  05/03/20  08:55    


 


Digoxin  0.74 ng/mL (0.8-2.0)  L  05/03/20  08:36    


 


Urine Opiates Screen  UNCONFIRMED POSITIVE   05/03/20  08:55    


 


Urine Methadone Screen  NEGATIVE   05/03/20  08:55    


 


Ur Barbiturates Screen  NEGATIVE   05/03/20  08:55    


 


Ur Phencyclidine Scrn  NEGATIVE   05/03/20  08:55    


 


Ur Amphetamines Screen  NEGATIVE   05/03/20  08:55    


 


U Benzodiazepines Scrn  NEGATIVE   05/03/20  08:55    


 


Urine Cocaine Screen  NEGATIVE   05/03/20  08:55    


 


U Marijuana (THC) Screen  UNCONFIRMED POSITIVE   05/03/20  08:55    


 


Serum Alcohol  < 10 mg/dL (NONE DETECTED)   05/03/20  08:36    








                                        











  05/03/20 05/03/20 05/03/20





  08:36 08:36 12:40


 


CK-MB (CK-2)    2.23


 


Troponin I  0.045   0.049


 


NT-Pro-B Natriuret Pep   3830 H 











Impressions: 


                                        





Abdomen Ultrasound  05/03/20 09:16


IMPRESSION:  Limited study.  Fatty enlarged liver.


 








Chest X-Ray  05/03/20 10:11


IMPRESSION:  NO SIGNIFICANT RADIOGRAPHIC FINDING IN THE CHEST.


 














Plan


Plan of Treatment: 


Patient is advised to avoid alcohol intake and cut down the alcohol intake to 

prevent further flareup of acute pancreatitis.


Time Spent: Greater than 30 Minutes





Stroke


Is this a Stroke Patient?: No





Acute Heart Failure





- **


Is this a Heart Failure Patient?: No